# Patient Record
Sex: MALE | Race: WHITE | URBAN - METROPOLITAN AREA
[De-identification: names, ages, dates, MRNs, and addresses within clinical notes are randomized per-mention and may not be internally consistent; named-entity substitution may affect disease eponyms.]

---

## 2022-06-01 ENCOUNTER — TELEPHONE (OUTPATIENT)
Dept: CARDIOLOGY CLINIC | Age: 82
End: 2022-06-01

## 2022-06-01 NOTE — LETTER
[address] [place], [place]  [address], [address] [place] [address]  [phone]  _____________________________________      PRE-OP RISK ASSESSMENT    [facility]  1940    [facility] is scheduled for EGD/Colonoscopy  with  . Office is asking to hold Plavix 7 days prior and up to 10 days after.     Reasonable to hold Plavix 7 days prior to procedure              Thank you,               6/2/2022  2:06 PM

## 2022-06-01 NOTE — TELEPHONE ENCOUNTER
Physician or Practice Requesting: Dr. Laura Lindsay, [facility] Person: [address] Phone Number: [phone]  Fax Number: [phone]  Date of Surgery/Procedure:   Type of Surgery or Procedure: EGD/ Colonoscopy  Type of Anesthesia:   Type of Clearance Requested: Med and cardiac  Medication to Hold: Plavix  Days to Hold: 7 days prior and up to 10 days after